# Patient Record
Sex: FEMALE | Race: WHITE
[De-identification: names, ages, dates, MRNs, and addresses within clinical notes are randomized per-mention and may not be internally consistent; named-entity substitution may affect disease eponyms.]

---

## 2020-11-24 ENCOUNTER — HOSPITAL ENCOUNTER (OUTPATIENT)
Dept: HOSPITAL 61 - KCIC | Age: 75
End: 2020-11-24
Attending: FAMILY MEDICINE
Payer: MEDICARE

## 2020-11-24 DIAGNOSIS — M16.11: Primary | ICD-10-CM

## 2020-11-24 PROCEDURE — 73502 X-RAY EXAM HIP UNI 2-3 VIEWS: CPT

## 2020-11-24 NOTE — KCIC
Right hip 2 views

 

INDICATION: Worsening hip pain. Recent pain injection in the office

 

COMPARISON: None

 

FINDINGS:

AP and frog-leg lateral views of the right hip show diffuse right hip 

joint space narrowing more conspicuous superiorly than medially with 

minimal subchondral sclerosis. In addition, there is osteophytic spurring 

around the humeral head, more confluent reactive bone formation around the

greater trochanter. No fracture or dislocation is apparent. Soft tissues 

are unremarkable.

 

IMPRESSION:

Advanced degenerative changes in the right hip joint. No fracture or 

dislocation shown.

 

Electronically signed by: Jnug Saunders MD (11/24/2020 4:08 PM) 

[id number]

## 2021-01-11 ENCOUNTER — HOSPITAL ENCOUNTER (OUTPATIENT)
Dept: HOSPITAL 61 - KCIC | Age: 76
Discharge: HOME | End: 2021-01-11
Attending: ORTHOPAEDIC SURGERY
Payer: MEDICARE

## 2021-01-11 DIAGNOSIS — M16.11: Primary | ICD-10-CM

## 2021-01-11 DIAGNOSIS — Z79.899: ICD-10-CM

## 2021-01-11 PROCEDURE — 20610 DRAIN/INJ JOINT/BURSA W/O US: CPT

## 2021-01-11 PROCEDURE — 77002 NEEDLE LOCALIZATION BY XRAY: CPT

## 2021-01-11 NOTE — KCIC
******** ADDENDUM #1 ********

Number of fluoroscopic images: 1



Electronically signed by: Esa Fields DO (3/12/2021 1:00 PM) [id number]

******** ORIGINAL REPORT ********

FLUOROSCOPICALLY GUIDED RIGHT HIP THERAPEUTIC INJECTION 



1.  INDICATION: The patient is a 75 years old Female who presented with right hip pain and osteoarthr
itis.



2.  CONSENT: The risks, benefits, treatment options, potential complications and personnel to be invo
lved were discussed (including the risks of radiation exposure, instruments to be used, contrast and 
anesthesia administration) with the patient. All questions were answered and consent was obtained. Th
e patient indicated willingness to proceed.  



3.  GENERAL:  

a)  Medication Reconciliation: The patient's medications and allergies were reviewed in the [facility] medical record and reconciled to the proposed procedure/treatment. 

     Pre-procedure Sign-in: Safety Checklist Performed    Yes

b)  Positioning: The patient was placed Supine on the fluoroscopy table.

c)  The hip was then sterilely prepped and draped.

d) Time Out: A time out was performed immediately prior to procedure start with the nursing, anesthes
ia and interventional team, correctly identifying the patient name, date of birth, procedure, anatomy
 (including marking of site and side), patient position, procedure consent form, relevant diagnostic 
and radiology test results, antibiotic administration, safety precautions, and procedure-specific equ
ipment needs. 

     Procedure Start Time / Timeout Time: 10:57

e)  Anesthesia Type: Local anesthesia: 3 mL 1% Lidocaine 



4.  PROCEDURE:

a)  Procedure Details: A 20g spinal needle was inserted into the hip joint. 1 mL Omnipaque 300 was in
jected to confirm intra-articular placement of needle. Contrast was observed to flow into the intra-a
rticular space of the joint without significant resistance. 6 mL of injectate was administered into t
he joint. The needle was removed. Images were stored to the permanent digital archive documenting nee
dle position.

b)  Injectate Contents:

      2 mL Methylprednisolone (Depo Medrol) 40mg/ml

      4 mL 0.25% Bupivacaine (Marcaine,Sensorcaine)

c)  Estimated Blood Loss: 0 mL



RADIATION DOSE:

Fluoroscopic Radiation Summary:



Fluoro time: 0:08 min:sec 



POST PROCEDURE: 

a)  Hemostasis: Hemostasis was achieved using light manual compression.

b)  Procedure End Time:  11:06

c)  Conclusion: The patient was discharged from the radiology department in stable condition.



COMPLICATIONS:

a)  Significant Patient Complication: None If other, explain:

b)  Complications during the procedure: None  If other, explain:



5.  RESULTS: Medication was injected into the joint. 



6. 

IMPRESSION: SUCCESSFUL FLUOROSCOPICALLY GUIDED THERAPEUTIC INJECTION OF THE RIGHT HIP AS DESCRIBED AB
AKASHE.



Electronically signed by: Esa Fields DO (1/11/2021 3:15 PM) [id number]

## 2021-02-03 ENCOUNTER — HOSPITAL ENCOUNTER (OUTPATIENT)
Dept: HOSPITAL 61 - ONCLAB | Age: 76
End: 2021-02-03
Attending: INTERNAL MEDICINE
Payer: MEDICARE

## 2021-02-03 DIAGNOSIS — D72.829: Primary | ICD-10-CM

## 2021-02-03 LAB
ANISOCYTOSIS BLD QL SMEAR: SLIGHT
BASOPHILS # BLD AUTO: 0.1 X10^3/UL (ref 0–0.2)
BASOPHILS NFR BLD: 1 % (ref 0–3)
EOSINOPHIL NFR BLD: 0.5 X10^3/UL (ref 0–0.7)
EOSINOPHIL NFR BLD: 5 % (ref 0–3)
ERYTHROCYTE [DISTWIDTH] IN BLOOD BY AUTOMATED COUNT: 12.9 % (ref 11.5–14.5)
HCT VFR BLD CALC: 42.7 % (ref 36–47)
HGB BLD-MCNC: 15.1 G/DL (ref 12–15.5)
LYMPHOCYTES # BLD: 2.2 X10^3/UL (ref 1–4.8)
LYMPHOCYTES NFR BLD AUTO: 22 % (ref 24–48)
MCH RBC QN AUTO: 31 PG (ref 25–35)
MCHC RBC AUTO-ENTMCNC: 35 G/DL (ref 31–37)
MCV RBC AUTO: 89 FL (ref 79–100)
MONO #: 0.7 X10^3/UL (ref 0–1.1)
MONOCYTES NFR BLD: 7 % (ref 0–9)
NEUT #: 6.4 X10^3/UL (ref 1.8–7.7)
NEUTROPHILS NFR BLD AUTO: 65 % (ref 31–73)
PLATELET # BLD AUTO: 227 X10^3/UL (ref 140–400)
PLATELET # BLD EST: ADEQUATE 10*3/UL
RBC # BLD AUTO: 4.81 X10^6/UL (ref 3.5–5.4)
WBC # BLD AUTO: 9.8 X10^3/UL (ref 4–11)

## 2021-02-03 PROCEDURE — 85025 COMPLETE CBC W/AUTO DIFF WBC: CPT

## 2021-02-03 PROCEDURE — 82746 ASSAY OF FOLIC ACID SERUM: CPT

## 2021-02-03 PROCEDURE — 36415 COLL VENOUS BLD VENIPUNCTURE: CPT

## 2021-02-03 PROCEDURE — 82607 VITAMIN B-12: CPT

## 2021-11-15 ENCOUNTER — HOSPITAL ENCOUNTER (OUTPATIENT)
Dept: HOSPITAL 61 - SURGPAT | Age: 76
End: 2021-11-15
Attending: ORTHOPAEDIC SURGERY
Payer: MEDICARE

## 2021-11-15 DIAGNOSIS — Z01.818: Primary | ICD-10-CM

## 2021-11-15 DIAGNOSIS — M16.11: ICD-10-CM

## 2021-11-15 LAB
ALBUMIN SERPL-MCNC: 4 G/DL (ref 3.4–5)
ANION GAP SERPL CALC-SCNC: 9 MMOL/L (ref 6–14)
APTT BLD: 28 SEC (ref 24–38)
BASOPHILS # BLD AUTO: 0.1 X10^3/UL (ref 0–0.2)
BASOPHILS NFR BLD: 1 % (ref 0–3)
BUN SERPL-MCNC: 12 MG/DL (ref 7–20)
CALCIUM SERPL-MCNC: 8.8 MG/DL (ref 8.5–10.1)
CHLORIDE SERPL-SCNC: 103 MMOL/L (ref 98–107)
CO2 SERPL-SCNC: 29 MMOL/L (ref 21–32)
CREAT SERPL-MCNC: 0.7 MG/DL (ref 0.6–1)
EOSINOPHIL NFR BLD: 0.5 X10^3/UL (ref 0–0.7)
EOSINOPHIL NFR BLD: 5 % (ref 0–3)
ERYTHROCYTE [DISTWIDTH] IN BLOOD BY AUTOMATED COUNT: 12.7 % (ref 11.5–14.5)
GFR SERPLBLD BASED ON 1.73 SQ M-ARVRAT: 80.9 ML/MIN
GLUCOSE SERPL-MCNC: 96 MG/DL (ref 70–99)
HCT VFR BLD CALC: 45.1 % (ref 36–47)
HGB BLD-MCNC: 15.4 G/DL (ref 12–15.5)
LYMPHOCYTES # BLD: 2.2 X10^3/UL (ref 1–4.8)
LYMPHOCYTES NFR BLD AUTO: 21 % (ref 24–48)
MCH RBC QN AUTO: 31 PG (ref 25–35)
MCHC RBC AUTO-ENTMCNC: 34 G/DL (ref 31–37)
MCV RBC AUTO: 90 FL (ref 79–100)
MONO #: 0.8 X10^3/UL (ref 0–1.1)
MONOCYTES NFR BLD: 7 % (ref 0–9)
NEUT #: 7 X10^3/UL (ref 1.8–7.7)
NEUTROPHILS NFR BLD AUTO: 66 % (ref 31–73)
PLATELET # BLD AUTO: 252 X10^3/UL (ref 140–400)
POTASSIUM SERPL-SCNC: 4.1 MMOL/L (ref 3.5–5.1)
PROTHROMBIN TIME: 12.5 SEC (ref 11.7–14)
RBC # BLD AUTO: 5.04 X10^6/UL (ref 3.5–5.4)
SODIUM SERPL-SCNC: 141 MMOL/L (ref 136–145)
WBC # BLD AUTO: 10.6 X10^3/UL (ref 4–11)

## 2021-11-15 PROCEDURE — 82306 VITAMIN D 25 HYDROXY: CPT

## 2021-11-15 PROCEDURE — 80048 BASIC METABOLIC PNL TOTAL CA: CPT

## 2021-11-15 PROCEDURE — 85651 RBC SED RATE NONAUTOMATED: CPT

## 2021-11-15 PROCEDURE — 82040 ASSAY OF SERUM ALBUMIN: CPT

## 2021-11-15 PROCEDURE — 93005 ELECTROCARDIOGRAM TRACING: CPT

## 2021-11-15 PROCEDURE — 87641 MR-STAPH DNA AMP PROBE: CPT

## 2021-11-15 PROCEDURE — 71046 X-RAY EXAM CHEST 2 VIEWS: CPT

## 2021-11-15 PROCEDURE — 85730 THROMBOPLASTIN TIME PARTIAL: CPT

## 2021-11-15 PROCEDURE — 85610 PROTHROMBIN TIME: CPT

## 2021-11-15 PROCEDURE — 83036 HEMOGLOBIN GLYCOSYLATED A1C: CPT

## 2021-11-15 PROCEDURE — 36415 COLL VENOUS BLD VENIPUNCTURE: CPT

## 2021-11-15 PROCEDURE — 85025 COMPLETE CBC W/AUTO DIFF WBC: CPT

## 2021-11-15 NOTE — EKG
[facility]

              [address] [place] [address]

Test Date:    2021-11-15               Test Time:    12:09:04

Pat Name:     LOR RON       Department:   

Patient ID:   [id number]           Room:          

Gender:       F                        Technician:   

:          [id number]               Requested By: EULALIO WINN

Order Number: [id number]           Reading MD:   Davy Bañuelos MD

                                 Measurements

Intervals                              Axis          

Rate:         92                       P:            56

CA:           132                      QRS:          7

QRSD:         82                       T:            76

QT:           352                                    

QTc:          440                                    

                           Interpretive Statements

SINUS RHYTHM

NON-SPECIFIC ST/T CHANGES

Electronically Signed On 2021 9:27:17 CST by Davy Bañuelos MD

## 2021-11-15 NOTE — RAD
EXAM: Chest, 2 views.



HISTORY: Preoperative evaluation. Hypertension.



COMPARISON: None.



FINDINGS: 2 views of the chest are obtained. There is no infiltrate, pleural effusion or pneumothorax
. The heart is normal in size. There is left basilar and lingular atelectasis or scarring.



IMPRESSION: No acute pulmonary finding.



Electronically signed by: Radha Frye MD (11/15/2021 1:18 PM) [id number]

## 2021-11-16 LAB — HBA1C MFR BLD: 5.8 % (ref 4.8–5.6)

## 2021-11-17 VITALS — SYSTOLIC BLOOD PRESSURE: 158 MMHG | DIASTOLIC BLOOD PRESSURE: 83 MMHG

## 2021-12-06 ENCOUNTER — HOSPITAL ENCOUNTER (OUTPATIENT)
Dept: HOSPITAL 61 - SURG | Age: 76
Setting detail: OBSERVATION
LOS: 2 days | Discharge: HOME HEALTH | End: 2021-12-08
Attending: ORTHOPAEDIC SURGERY | Admitting: ORTHOPAEDIC SURGERY
Payer: MEDICARE

## 2021-12-06 VITALS — DIASTOLIC BLOOD PRESSURE: 83 MMHG | SYSTOLIC BLOOD PRESSURE: 158 MMHG

## 2021-12-06 VITALS — DIASTOLIC BLOOD PRESSURE: 51 MMHG | SYSTOLIC BLOOD PRESSURE: 120 MMHG

## 2021-12-06 VITALS — WEIGHT: 148.15 LBS | HEIGHT: 61 IN | BODY MASS INDEX: 27.97 KG/M2

## 2021-12-06 VITALS — SYSTOLIC BLOOD PRESSURE: 111 MMHG | DIASTOLIC BLOOD PRESSURE: 37 MMHG

## 2021-12-06 DIAGNOSIS — Z96.649: ICD-10-CM

## 2021-12-06 DIAGNOSIS — M16.11: Primary | ICD-10-CM

## 2021-12-06 DIAGNOSIS — Z79.899: ICD-10-CM

## 2021-12-06 DIAGNOSIS — Z79.82: ICD-10-CM

## 2021-12-06 PROCEDURE — 97535 SELF CARE MNGMENT TRAINING: CPT

## 2021-12-06 PROCEDURE — 97162 PT EVAL MOD COMPLEX 30 MIN: CPT

## 2021-12-06 PROCEDURE — G0378 HOSPITAL OBSERVATION PER HR: HCPCS

## 2021-12-06 PROCEDURE — 85018 HEMOGLOBIN: CPT

## 2021-12-06 PROCEDURE — 88311 DECALCIFY TISSUE: CPT

## 2021-12-06 PROCEDURE — 86850 RBC ANTIBODY SCREEN: CPT

## 2021-12-06 PROCEDURE — 96376 TX/PRO/DX INJ SAME DRUG ADON: CPT

## 2021-12-06 PROCEDURE — A4930 STERILE, GLOVES PER PAIR: HCPCS

## 2021-12-06 PROCEDURE — 85014 HEMATOCRIT: CPT

## 2021-12-06 PROCEDURE — 27130 TOTAL HIP ARTHROPLASTY: CPT

## 2021-12-06 PROCEDURE — G0379 DIRECT REFER HOSPITAL OBSERV: HCPCS

## 2021-12-06 PROCEDURE — 97116 GAIT TRAINING THERAPY: CPT

## 2021-12-06 PROCEDURE — 86900 BLOOD TYPING SEROLOGIC ABO: CPT

## 2021-12-06 PROCEDURE — 97530 THERAPEUTIC ACTIVITIES: CPT

## 2021-12-06 PROCEDURE — 85610 PROTHROMBIN TIME: CPT

## 2021-12-06 PROCEDURE — 86901 BLOOD TYPING SEROLOGIC RH(D): CPT

## 2021-12-06 PROCEDURE — C1776 JOINT DEVICE (IMPLANTABLE): HCPCS

## 2021-12-06 PROCEDURE — 97165 OT EVAL LOW COMPLEX 30 MIN: CPT

## 2021-12-06 PROCEDURE — 96365 THER/PROPH/DIAG IV INF INIT: CPT

## 2021-12-06 PROCEDURE — 88304 TISSUE EXAM BY PATHOLOGIST: CPT

## 2021-12-06 PROCEDURE — A6550 NEG PRES WOUND THER DRSG SET: HCPCS

## 2021-12-06 PROCEDURE — A4213 20+ CC SYRINGE ONLY: HCPCS

## 2021-12-06 PROCEDURE — 72170 X-RAY EXAM OF PELVIS: CPT

## 2021-12-06 PROCEDURE — A6258 TRANSPARENT FILM >16<=48 IN: HCPCS

## 2021-12-06 PROCEDURE — 96366 THER/PROPH/DIAG IV INF ADDON: CPT

## 2021-12-06 PROCEDURE — 36415 COLL VENOUS BLD VENIPUNCTURE: CPT

## 2021-12-06 PROCEDURE — 97150 GROUP THERAPEUTIC PROCEDURES: CPT

## 2021-12-06 RX ADMIN — BACITRACIN SCH MLS/HR: 5000 INJECTION, POWDER, FOR SOLUTION INTRAMUSCULAR at 08:45

## 2021-12-06 RX ADMIN — HYDROMORPHONE HYDROCHLORIDE PRN MG: 2 INJECTION INTRAMUSCULAR; INTRAVENOUS; SUBCUTANEOUS at 09:29

## 2021-12-06 RX ADMIN — HYDROMORPHONE HYDROCHLORIDE PRN MG: 2 INJECTION INTRAMUSCULAR; INTRAVENOUS; SUBCUTANEOUS at 10:41

## 2021-12-06 RX ADMIN — HYDROMORPHONE HYDROCHLORIDE PRN MG: 2 INJECTION INTRAMUSCULAR; INTRAVENOUS; SUBCUTANEOUS at 10:19

## 2021-12-06 RX ADMIN — MULTIPLE VITAMINS W/ MINERALS TAB SCH TAB: TAB at 09:00

## 2021-12-06 RX ADMIN — ONDANSETRON SCH MG: 4 TABLET, ORALLY DISINTEGRATING ORAL at 19:01

## 2021-12-06 RX ADMIN — ONDANSETRON SCH MG: 2 INJECTION INTRAMUSCULAR; INTRAVENOUS at 12:00

## 2021-12-06 RX ADMIN — ONDANSETRON SCH MG: 4 TABLET, ORALLY DISINTEGRATING ORAL at 12:00

## 2021-12-06 RX ADMIN — SENNOSIDES AND DOCUSATE SODIUM SCH TAB: 8.6; 5 TABLET ORAL at 09:00

## 2021-12-06 RX ADMIN — HYDROMORPHONE HYDROCHLORIDE PRN MG: 2 INJECTION INTRAMUSCULAR; INTRAVENOUS; SUBCUTANEOUS at 10:01

## 2021-12-06 RX ADMIN — MORPHINE SULFATE PRN MG: 2 INJECTION, SOLUTION INTRAMUSCULAR; INTRAVENOUS at 09:17

## 2021-12-06 RX ADMIN — ONDANSETRON SCH MG: 4 TABLET, ORALLY DISINTEGRATING ORAL at 23:58

## 2021-12-06 RX ADMIN — ONDANSETRON SCH MG: 2 INJECTION INTRAMUSCULAR; INTRAVENOUS at 18:00

## 2021-12-06 RX ADMIN — Medication SCH MG: at 19:01

## 2021-12-06 RX ADMIN — HYDROMORPHONE HYDROCHLORIDE PRN MG: 2 INJECTION INTRAMUSCULAR; INTRAVENOUS; SUBCUTANEOUS at 09:42

## 2021-12-06 RX ADMIN — HYDROMORPHONE HYDROCHLORIDE PRN MG: 2 INJECTION INTRAMUSCULAR; INTRAVENOUS; SUBCUTANEOUS at 11:13

## 2021-12-06 RX ADMIN — MORPHINE SULFATE PRN MG: 2 INJECTION, SOLUTION INTRAMUSCULAR; INTRAVENOUS at 09:05

## 2021-12-06 RX ADMIN — HYDROMORPHONE HYDROCHLORIDE PRN MG: 2 INJECTION INTRAMUSCULAR; INTRAVENOUS; SUBCUTANEOUS at 11:56

## 2021-12-06 NOTE — OP
DATE OF SURGERY: 12/06/2021

PREOPERATIVE DIAGNOSIS:  Severe degenerative joint disease, right hip.



POSTOPERATIVE DIAGNOSIS:  Severe degenerative joint disease, right hip.



PROCEDURE:  Right total hip arthroplasty.



SURGEON:  Dimas Salomon Jr, DO.



FIRST ASSISTANT:  Gregor Enrique.



ANESTHESIA:  General.



COMPLICATIONS:  None.



ESTIMATED BLOOD LOSS:  250 mL



COMPONENTS:  Size 5 femur, size 52 acetabular shell with a 36 mm femoral head, 

elevated acetabular liner size E.



DESCRIPTION OF PROCEDURE:  The patient was taken to the operative suite, given a

general anesthetic, placed in the lateral decubitus position with the affected 

hip upright, this was prepped and draped in a sterile fashion.  A standard 

anterolateral approach was undertaken through the hip with incision through skin

and subcutaneous tissues that was taken to the iliotibial band, which was split 

in line with the skin incision.  This was retracted anteriorly and posteriorly 

and a portion of the gluteus medius and minimus were removed.  The upper 

one-half was remaining intact.  Neck was identified and opened up in an H 

fashion.  The head was then dislocated.  This was cut one fingerbreadth above 

the lesser trochanter and then this was measured and noted to be a size 46-47.  

This was subsequently reamed by 1 mm increments from size 46 all the way up to 

size 52 and then the acetabular shell was noted to be secured within the 

acetabulum and malleted in.  Two screws were placed through the acetabular shell

with very good fixation, one of 25 mm and one of 20 mm of length.  After this 

was noted to have good purchase, the elevated liner was then placed and impacted

and noted to be stable.  The femur was then opened with a  and then IM

guide was placed, this was removed and the broaching began at size 0 and 

continued up to size 5.  After this was noted to be size 5, this was noted to be

stable.  Therefore, this was reduced.  Attempted reductions at 0 and -2.5 were 

unsuccessful.  Therefore, at -5 this had a very good reduction.  Pistoning was 

good.  Full range of motion was noted without any signs of instability 

whatsoever.  Therefore, this was subsequently redislocated carefully and the 

actual femoral head was affixed on the femoral component and was relocated.  

This was again noted to be stable with extremes in range of motion.  This was 

then thoroughly irrigated again, as it was throughout the case and then was 

irrigated with iodine and then completely irrigated after that.  The capsule was

then reapproximated in an interrupted fashion.  The gluteus medius and minimus 

were reapproximated to their original position in an interrupted fashion.  Then 

as the iliotibial band was reapproximated in a running fashion, superficial 

tissue and skin was reapproximated.  A sterile dressing was applied.  The 

patient was then taken from the operative bed to the postoperative bed, taken to

the PACU in stable condition.







KODAK

DR: Saida   DD: 12/06/2021 08:54

DT: 12/06/2021 09:36   TID: [id number]

## 2021-12-06 NOTE — PDOC1
History and Physical


Date of Service:


DOS:


DATE: 12/6/21 


TIME: 14:00





Chief Complaint:


Chief Complain:


Status post right hip arthroplasty





History of Present Illness:


HPI:


Patient is a 73-year-old female who was brought in for surgery this morning with

orthopedics for a total right hip arthroplasty for Severe degenerative joint 

disease right hip. Was asked to admit patient after procedure from the PACU.


Patient was evaluated in the PACU said she was doing well resting in bed 

anesthesia still wearing off. Says her pain was currently well controlled. 

Denying any other sort of complaints.


Will admit to MedSurg unit. PT OT if okay with Ortho. Pain control


Patient was denying headache dizziness chest pain shortness of breath abdominal 

pain dysuria.





Past Medical/Surgical History:


PMH/PSH:


Severe degenerative joint disease right hip





Allergies:


Allergies:  


Coded Allergies:  


     No Known Drug Allergies (Unverified , 12/6/21)





Family History:


Family History:


Reviewed with patient none known





Social History:


Social History:


Denies alcohol tobacco drug





Current Medications:


Current Medications





Current Medications


Morphine Sulfate 5 mg/Ketorolac Tromethamine 30 mg/Ropivacaine 60 ml/Epinephrine

HCl 0.5 mg/ Miscellaneous 63 ml @ 63 mls/hr 1X PERIOP  ONCE INT ART  Last 

administered on 12/6/21at 08:23;  Start 12/6/21 at 06:00;  Stop 12/6/21 at 

06:59;  Status DC


Meloxicam (Mobic) 15 mg 1X PREOP  PRN PO PRIOR TO PROCEDURE Last administered on

12/6/21at 06:30;  Start 12/6/21 at 06:00;  Stop 12/6/21 at 18:00


Gabapentin (Neurontin) 300 mg 1X PREOP  PRN PO PRIOR TO PROCEDURE Last 

administered on 12/6/21at 06:30;  Start 12/6/21 at 06:00;  Stop 12/6/21 at 18:00


Acetaminophen (Tylenol) 1,000 mg 1X PREOP  PRN PO PRIOR TO PROCEDURE Last 

administered on 12/6/21at 06:30;  Start 12/6/21 at 06:00;  Stop 12/6/21 at 18:00


Cefazolin Sodium/ Dextrose 50 ml @  100 mls/hr 1X PREOP  PRN IV PRIOR TO 

PROCEDURE Last administered on 12/6/21at 07:27;  Start 12/6/21 at 06:00;  Stop 

12/6/21 at 18:00


Tranexamic Acid 50 ml @ 50 mls/hr 1X PERIOP  ONCE INJ  Last administered on 

12/6/21at 08:23;  Start 12/6/21 at 06:00;  Stop 12/6/21 at 06:59;  Status DC


Tranexamic Acid 50 ml @ 50 mls/hr 1X PERIOP  ONCE INJ ;  Start 12/6/21 at 08:00;

 Stop 12/6/21 at 08:59;  Status DC


Fentanyl Citrate (Fentanyl 2ml Vial) 25 mcg PRN Q5MIN  PRN IVP MILD PAIN 1-3;  

Start 12/6/21 at 06:15;  Stop 12/7/21 at 06:14


Fentanyl Citrate (Fentanyl 2ml Vial) 50 mcg PRN Q5MIN  PRN IVP MODERATE PAIN 4-

6;  Start 12/6/21 at 06:15;  Stop 12/7/21 at 06:14


Morphine Sulfate (Morphine Sulfate) 1 mg PRN Q10MIN  PRN IVP SEVERE PAIN 7-10 

Last administered on 12/6/21at 09:17;  Start 12/6/21 at 06:15;  Stop 12/7/21 at 

06:14


Ringer's Solution 1,000 ml @  30 mls/hr Q24H IV  Last administered on 12/6/21at 

06:29;  Start 12/6/21 at 06:15;  Stop 12/6/21 at 18:14


Hydromorphone HCl (Dilaudid) 0.5 mg PRN Q10MIN  PRN IVP SEVERE PAIN 7-10, 2nd 

CHOICE Last administered on 12/6/21at 11:56;  Start 12/6/21 at 06:15;  Stop 

12/7/21 at 06:14


Prochlorperazine Edisylate (Compazine) 5 mg PACU PRN  PRN IVP NAUSEA, MRX1;  

Start 12/6/21 at 06:15;  Stop 12/7/21 at 06:14


Propofol (Diprivan) 200 mg STK-MED ONCE IV ;  Start 12/6/21 at 06:48;  Stop 

12/6/21 at 06:48;  Status DC


Rocuronium Bromide (Zemuron) 50 mg STK-MED ONCE .ROUTE ;  Start 12/6/21 at 

06:49;  Stop 12/6/21 at 06:49;  Status DC


Propofol (Diprivan) 200 mg STK-MED ONCE IV ;  Start 12/6/21 at 06:49;  Stop 

12/6/21 at 06:49;  Status DC


Ondansetron HCl (Zofran) 4 mg STK-MED ONCE .ROUTE ;  Start 12/6/21 at 06:49;  

Stop 12/6/21 at 06:49;  Status DC


Fentanyl Citrate (Fentanyl 2ml Vial) 100 mcg STK-MED ONCE .ROUTE ;  Start 

12/6/21 at 06:50;  Stop 12/6/21 at 06:50;  Status DC


Phenylephrine HCl (PHENYLEPHRINE in 0.9% NACL PF) 1 mg STK-MED ONCE IV ;  Start 

12/6/21 at 06:59;  Stop 12/6/21 at 07:00;  Status DC


Phenylephrine HCl (PHENYLEPHRINE in 0.9% NACL PF) 1 mg STK-MED ONCE IV ;  Start 

12/6/21 at 07:00;  Stop 12/6/21 at 07:00;  Status DC


Glycopyrrolate (Robinul) 1 mg STK-MED ONCE .ROUTE ;  Start 12/6/21 at 07:00;  

Stop 12/6/21 at 07:01;  Status DC


Vancomycin HCl (Vancomycin) 1 gm STK-MED ONCE .ROUTE  Last administered on 

12/6/21at 08:22;  Start 12/6/21 at 07:13;  Stop 12/6/21 at 07:13;  Status DC


Tranexamic Acid 50 ml @ As Directed STK-MED ONCE .ROUTE ;  Start 12/6/21 at 

07:19;  Stop 12/6/21 at 07:19;  Status DC


Neostigmine Bromide (Neostigmine Methylsulfate) 5 mg STK-MED ONCE .ROUTE ;  

Start 12/6/21 at 08:27;  Stop 12/6/21 at 08:28;  Status DC


Fentanyl Citrate (Fentanyl 2ml Vial) 25 mcg PRN Q1HR  PRN IVP PAIN, 2nd CHOICE; 

Start 12/6/21 at 08:45


Diphenhydramine HCl (Benadryl) 25 mg PRN Q6HRS  PRN IVP ITCHING;  Start 12/6/21 

at 08:45


Multivitamins (Thera M Plus) 1 tab DAILY PO ;  Start 12/6/21 at 09:00


Senna/Docusate Sodium (Senna Plus) 1 tab DAILY PO ;  Start 12/6/21 at 09:00


Ferrous Sulfate (Feosol) 325 mg BIDWMEALS PO ;  Start 12/6/21 at 17:00


Sodium Chloride 1,000 ml @  40 mls/hr Q24H IV ;  Start 12/6/21 at 08:45


Prochlorperazine Maleate (Compazine) 10 mg PRN Q4HRS  PRN PO Nausea/vomiting, 

2nd choice;  Start 12/6/21 at 08:45


Metoclopramide HCl (Reglan Vial) 10 mg PRN Q4HRS  PRN IVP NAUSEA/VOMITING, 3rd 

CHOICE;  Start 12/6/21 at 08:45


Magnesium Hydroxide (Milk Of Magnesia) 2,400 mg 1X PRN  PRN PO CONSTIPATION;  

Start 12/7/21 at 06:00;  Stop 12/8/21 at 05:59


Bisacodyl (Dulcolax Supp) 10 mg 1X PRN  PRN AK CONSTIPATION;  Start 12/7/21 at 

16:00;  Stop 12/8/21 at 15:59


Zolpidem Tartrate (Ambien) 5 mg PRN QHS  PRN PO INSOMNIA, MAY REPEAT IN 1HR;  

Start 12/6/21 at 08:45


Calcium Carbonate/ Glycine (Tums) 500 mg PRN QID  PRN PO INDIGESTION;  Start 

12/6/21 at 08:45


Ketorolac Tromethamine 30 mg/Bupivacaine HCl 20 ml/ Epinephrine HCl 0.5 mg/ 

Miscellaneous 43 ml @  258 mls/hr Q12H INT ART ;  Start 12/6/21 at 18:00;  Stop 

12/7/21 at 06:09;  Status UNV


Sodium Chloride (Normal Saline Flush) 10 ml QSHIFT  PRN IV AFTER MEDS AND BLOOD 

DRAWS;  Start 12/6/21 at 08:45


Acetaminophen (Tylenol) 1,000 mg Q6H PO ;  Start 12/7/21 at 09:00


Tramadol HCl (Ultram) 50 mg Q6H PO ;  Start 12/7/21 at 06:00


Gabapentin (Neurontin) 100 mg Q8HRS PO ;  Start 12/7/21 at 06:00


Ondansetron HCl (Zofran) 4 mg Q6HRS IVP ;  Start 12/6/21 at 12:00;  Stop 12/7/21

at 06:01


Ondansetron HCl (Zofran Odt) 4 mg Q6HRS PO ;  Start 12/6/21 at 12:00;  Stop 

12/7/21 at 06:01


Ondansetron HCl (Zofran) 4 mg PRN Q6HRS  PRN IVP Nausea/vomiting, 1st choice;  

Start 12/7/21 at 12:00


Ondansetron HCl (Zofran Odt) 4 mg PRN Q6HRS  PRN PO Nausea/vomiting, 1st choice;

 Start 12/7/21 at 12:00


Oxycodone HCl (Roxicodone) 5 mg PRN Q4HRS  PRN PO Pain score 4-6;  Start 12/6/21

at 08:45


Dextrose (Dextrose 50%-Water Syringe) 12.5 gm PRN Q15MIN  PRN IV SEE COMMENTS;  

Start 12/6/21 at 08:45


Cefazolin Sodium/ Dextrose 50 ml @  100 mls/hr Q6H IV ;  Start 12/6/21 at 13:30;

 Stop 12/7/21 at 01:59


Aspirin (Michelle Aspirin) 325 mg DAILYWBKFT PO ;  Start 12/7/21 at 08:00


Morphine Sulfate (Morphine Sulfate) 2 mg STK-MED ONCE .ROUTE ;  Start 12/6/21 at

09:10;  Stop 12/6/21 at 09:10;  Status DC


Hydromorphone HCl (Dilaudid) 2 mg STK-MED ONCE .ROUTE ;  Start 12/6/21 at 09:24;

 Stop 12/6/21 at 09:24;  Status DC


Ondansetron HCl (Zofran) 4 mg PRN Q6HRS  PRN IVP NAUSEA/VOMITING;  Start 12/6/21

at 10:00


Calcium Carbonate/ Glycine (Tums) 500 mg PRN Q3HRS  PRN PO UPSET STOMACH;  Start

12/6/21 at 10:00;  Status UNV


Zolpidem Tartrate (Ambien) 5 mg PRN QHS  PRN PO INSOMNIA, MAY REPEAT IN 1HR;  

Start 12/6/21 at 10:00;  Status UNV


Info (Non-Icu Electrolyte Protocol) 1 ea PRN DAILY  PRN MC SEE COMMENTS;  Start 

12/6/21 at 10:00


Oxycodone HCl (Roxicodone) 5 mg PRN Q3HRS  PRN PO BREAKTHROUGH PAIN;  Start 

12/6/21 at 10:00;  Status UNV


Morphine Sulfate (Morphine Sulfate) 1 mg PRN Q1HR  PRN IV PAIN;  Start 12/6/21 

at 10:00;  Status UNV


Morphine Sulfate (Morphine Sulfate) 2 mg PRN Q1HR  PRN IV PAIN;  Start 12/6/21 

at 10:00;  Status UNV


Acetaminophen (Tylenol) 650 mg PRN Q6HRS  PRN PO Headaches, Temp > 101.5F;  

Start 12/6/21 at 10:00


Senna/Docusate Sodium (Senna Plus) 1 tab BID PO ;  Start 12/6/21 at 21:00;  

Status UNV


Heparin Sodium (Porcine) (Heparin Sodium) 5,000 unit Q12HR SQ ;  Start 12/7/21 

at 09:00;  Status UNV


Hydromorphone HCl (Dilaudid) 2 mg STK-MED ONCE .ROUTE ;  Start 12/6/21 at 10:25;

 Stop 12/6/21 at 10:26;  Status DC





Active Scripts


Active


Reported


Systane 0.3-0.4% Eye Drops (Propylene Glycol/Peg 400) 15 Ml Drops 1 Drop OP HS


[Essential Oil]   1 Cap PO DAILY


Vitamin D3 (Cholecalciferol (Vitamin D3)) 25 Mcg Capsule 25 Mcg PO DAILY


Tums (Calcium Carbonate) 300 Mg Tab.chew 300 Mg PO DAILY


Multivitamins (Multivitamin) 1 Each Tablet 1 Each PO DAILY


Meloxicam 15 Mg Tablet 15 Mg PO DAILY





ROS:


Review of Systems


Review of System


Unless noted in HPI 14 point review of systems was negative





Physical Exam:


Vital Signs:





Vital Signs








  Date Time  Temp Pulse Resp B/P (MAP) Pulse Ox O2 Delivery O2 Flow Rate FiO2


 


12/6/21 12:49  74 16 105/36 95 Nasal Cannula 2.0 


 


12/6/21 09:01 97.4       





 97.4       








Physcial Exam:


GEN:   No apparent distress.  Alert and oriented


HEENT:   Normal cephalic, atraumatic, external auditory canals are patent


EYES:   Extraocular muscles are intact, pupil are equally round and reactive to 

light and accommodation


MUSCULOSKELETAL: Range of motion limited by pain


ENDOCRINE:   No thyromegaly was palpated


LYMPHATICS:   No cervical chain or axillary nodes were noted


HEMATOPOIETIC:  No bruising


NECK:   Supple, no JVD, no thyromegaly was noted


LUNGS:   Clear to auscultation in all lung fields without rhonchi or wheezing


HEART:    RRR, S!, S2 present.  Peripheral pulses intact, no obvious murmurs 

noted


ABDOMEN:   Soft, nontender.  Positive bowel sounds, no organomegaly, normal 

bowel sounds


EXTREMITIES:   Without clubbing, cyanosis, or edema.  Pedal pulses intact.


NEUROLOGIC:   Normal speech and tone.  A&O x 3, moves all extremities, no 

obvious focal deficits


PSYCHIATRIC:   Normal affect, normal mood. Stable


SKIN:   No ulcerations or rashes, good skin turgor, no jaundice. Surgical site 

CDI


VASCULAR:   Good capillary refill, neurovascular bundle appears to be intact





Assessment/Plan


Assessment/Plan


Status post total right hip arthroplasty





-Admit to Medr. Patient tolerating postop course well


-As needed pain control


-Reviewed home meds looks like patient is mostly just on vitamins she can resume

these after discharge


-PT OT ordered


-Advance diet as tolerated.


-DVT prophylaxis


-Hip care per Ortho





Justifications for Admission


Other Justification














KAILA JONES MD          Dec 6, 2021 14:04

## 2021-12-06 NOTE — RAD
Pelvis radiograph: 12/6/2021 9:21 AM.



Reason for study: Postoperative.



Comparison: Pelvis radiograph 11/24/2020



Technique: Two views of the right hip are obtained.



Findings:

There are findings consistent with recent right total hip arthroplasty. Femoral and acetabular hardwa
re is in good alignment and position. Immediate postsurgical changes within the regional soft tissues
 are noted. No complications are evident. Left hip is intact.



Impression:



Status post right total hip arthroplasty. 



Electronically signed by: Alicia Brumfield MD (12/6/2021 9:37 AM) [id number]

## 2021-12-06 NOTE — PDOC4
OPERATIVE NOTE


Date:


Date:  Dec 6, 2021





Pre-Op Diagnosis:


Severe degenerative joint disease right hip





Post-Op Diagnosis:


Same





Procedure Performed:


Right total hip arthroplasty





Surgeon:


Umm





Anesthesia Type:


General





Blood Loss:


250 cc





Specimans Obtained:


Femoral head





Findings:


See dictation





Complications:


None





Operative Note:


Size 52 acetabular shell with a 36 mm femoral head -5 size 5 femoral











EULALIO WINN Jr., DO      Dec 6, 2021 08:42

## 2021-12-07 VITALS — DIASTOLIC BLOOD PRESSURE: 40 MMHG | SYSTOLIC BLOOD PRESSURE: 122 MMHG

## 2021-12-07 VITALS — SYSTOLIC BLOOD PRESSURE: 105 MMHG | DIASTOLIC BLOOD PRESSURE: 28 MMHG

## 2021-12-07 VITALS — SYSTOLIC BLOOD PRESSURE: 117 MMHG | DIASTOLIC BLOOD PRESSURE: 46 MMHG

## 2021-12-07 VITALS — SYSTOLIC BLOOD PRESSURE: 104 MMHG | DIASTOLIC BLOOD PRESSURE: 41 MMHG

## 2021-12-07 VITALS — SYSTOLIC BLOOD PRESSURE: 109 MMHG | DIASTOLIC BLOOD PRESSURE: 43 MMHG

## 2021-12-07 VITALS — DIASTOLIC BLOOD PRESSURE: 38 MMHG | SYSTOLIC BLOOD PRESSURE: 109 MMHG

## 2021-12-07 LAB
HCT VFR BLD CALC: 33.1 % (ref 36–47)
HGB BLD-MCNC: 11.2 G/DL (ref 12–15.5)
MCHC RBC AUTO-ENTMCNC: 34 G/DL (ref 31–37)
PROTHROMBIN TIME: 13.6 SEC (ref 11.7–14)

## 2021-12-07 RX ADMIN — ACETAMINOPHEN SCH MG: 500 TABLET ORAL at 15:00

## 2021-12-07 RX ADMIN — Medication SCH MG: at 17:55

## 2021-12-07 RX ADMIN — SENNOSIDES AND DOCUSATE SODIUM SCH TAB: 8.6; 5 TABLET ORAL at 08:22

## 2021-12-07 RX ADMIN — ACETAMINOPHEN SCH MG: 500 TABLET ORAL at 20:23

## 2021-12-07 RX ADMIN — BACITRACIN SCH MLS/HR: 5000 INJECTION, POWDER, FOR SOLUTION INTRAMUSCULAR at 08:45

## 2021-12-07 RX ADMIN — MULTIPLE VITAMINS W/ MINERALS TAB SCH TAB: TAB at 08:22

## 2021-12-07 RX ADMIN — GABAPENTIN SCH MG: 100 CAPSULE ORAL at 13:29

## 2021-12-07 RX ADMIN — ACETAMINOPHEN SCH MG: 500 TABLET ORAL at 08:22

## 2021-12-07 RX ADMIN — ASPIRIN 325 MG ORAL TABLET SCH MG: 325 PILL ORAL at 08:21

## 2021-12-07 RX ADMIN — ONDANSETRON SCH MG: 2 INJECTION INTRAMUSCULAR; INTRAVENOUS at 00:00

## 2021-12-07 RX ADMIN — ONDANSETRON SCH MG: 4 TABLET, ORALLY DISINTEGRATING ORAL at 06:00

## 2021-12-07 RX ADMIN — GABAPENTIN SCH MG: 100 CAPSULE ORAL at 06:00

## 2021-12-07 RX ADMIN — ONDANSETRON SCH MG: 2 INJECTION INTRAMUSCULAR; INTRAVENOUS at 06:00

## 2021-12-07 RX ADMIN — GABAPENTIN SCH MG: 100 CAPSULE ORAL at 22:48

## 2021-12-07 RX ADMIN — Medication SCH MG: at 08:22

## 2021-12-07 NOTE — PDOC
[employer] PROGRESS NOTE


Date of Service


DOS:


DATE: 12/7/21 


TIME: 11:25





Chief Complaint


Chief Complaint


A/P:


Right hip OA - Status post total right hip arthroplasty on 12/6/2021





-Admit to [facility]. Patient tolerating postop course well


-As needed pain control


-Reviewed home meds looks like patient is mostly just on vitamins she can resume

these after discharge


-PT OT ordered


-Advance diet as tolerated.


-DVT prophylaxis - warfarin, daily INR


-Hip care per Ortho





History of Present Illness


History of Present Illness


Patient is a 73-year-old female who was brought in for surgery 12/6/2021 in the 

morning with orthopedics for a total right hip arthroplasty for Severe 

degenerative joint disease right hip. Was asked to admit patient after procedure

from the PACU.


Patient was evaluated in the PACU said she was doing well resting in bed 

anesthesia still wearing off. Says her pain was currently well controlled. 

Denying any other sort of complaints.


Admitted to MedSur unit. PT OT if okay with Ortho. Pain control





12/7: Patient was denying headache dizziness chest pain shortness of breath 

abdominal pain dysuria. Working in PT class today.





Vitals/I&O


Vitals/I&O:





                                   Vital Signs








  Date Time  Temp Pulse Resp B/P (MAP) Pulse Ox O2 Delivery O2 Flow Rate FiO2


 


12/7/21 08:15      Room Air  


 


12/7/21 07:00 98.0 88 16 122/40 (67) 95   





 98.0       


 


12/7/21 00:26       2.0 














                                    I & O   


 


 12/6/21 12/6/21 12/7/21





 15:00 23:00 07:00


 


Intake Total 1050 ml  480 ml


 


Output Total 100 ml  


 


Balance 950 ml  480 ml











Physical Exam


General:  Alert, Oriented X3, Cooperative


Heart:  Regular rate, Normal S1, Normal S2


Lungs:  Clear


Abdomen:  Normal bowel sounds, Soft


Extremities:  No clubbing, No cyanosis


Skin:  No rashes, No breakdown, Other





Labs


Labs:





Laboratory Tests








Test


 12/7/21


04:30


 


Hemoglobin


 11.2 g/dL


(12.0-15.5)


 


Hematocrit


 33.1 %


(36.0-47.0)


 


Mean Corpuscular Hemoglobin


Concent 34 g/dL


(31-37)


 


Prothrombin Time


 13.6 SEC


(11.7-14.0)


 


Prothromb Time International


Ratio 1.0 (0.8-1.1) 














Comment


Review of Relevant


I have reviewed the following items stephanie (where applicable) has been applied.


Medications:





Current Medications








 Medications


  (Trade)  Dose


 Ordered  Sig/Nanette


 Route


 PRN Reason  Start Time


 Stop Time Status Last Admin


Dose Admin


 


 Ferrous Sulfate


  (Feosol)  325 mg  BIDWMEALS


 PO


   12/6/21 17:00


    12/7/21 08:22





 


 Acetaminophen


  (Tylenol)  1,000 mg  Q6H


 PO


   12/7/21 09:00


    12/7/21 08:22





 


 Ondansetron HCl


  (Zofran Odt)  4 mg  Q6HRS


 PO


   12/6/21 12:00


 12/7/21 06:01 DC 12/6/21 23:58





 


 Cefazolin Sodium/


 Dextrose  50 ml @ 


 100 mls/hr  Q6H


 IV


   12/6/21 13:30


 12/7/21 01:59 DC 12/7/21 08:11





 


 Aspirin


  (Michelle Aspirin)  325 mg  DAILYWBKFT


 PO


   12/7/21 08:00


    12/7/21 08:21














Justifications for Admission


Other Justification














KAILA GARCES MD         Dec 7, 2021 11:26

## 2021-12-08 VITALS — DIASTOLIC BLOOD PRESSURE: 41 MMHG | SYSTOLIC BLOOD PRESSURE: 119 MMHG

## 2021-12-08 VITALS — DIASTOLIC BLOOD PRESSURE: 55 MMHG | SYSTOLIC BLOOD PRESSURE: 130 MMHG

## 2021-12-08 VITALS — DIASTOLIC BLOOD PRESSURE: 47 MMHG | SYSTOLIC BLOOD PRESSURE: 123 MMHG

## 2021-12-08 LAB
HCT VFR BLD CALC: 31.3 % (ref 36–47)
HGB BLD-MCNC: 10.4 G/DL (ref 12–15.5)
MCHC RBC AUTO-ENTMCNC: 33 G/DL (ref 31–37)
PROTHROMBIN TIME: 15.8 SEC (ref 11.7–14)

## 2021-12-08 RX ADMIN — ASPIRIN 325 MG ORAL TABLET SCH MG: 325 PILL ORAL at 08:56

## 2021-12-08 RX ADMIN — GABAPENTIN SCH MG: 100 CAPSULE ORAL at 06:06

## 2021-12-08 RX ADMIN — MULTIPLE VITAMINS W/ MINERALS TAB SCH TAB: TAB at 08:54

## 2021-12-08 RX ADMIN — Medication SCH MG: at 08:54

## 2021-12-08 RX ADMIN — SENNOSIDES AND DOCUSATE SODIUM SCH TAB: 8.6; 5 TABLET ORAL at 08:54

## 2021-12-08 RX ADMIN — ACETAMINOPHEN SCH MG: 500 TABLET ORAL at 03:05

## 2021-12-08 RX ADMIN — ACETAMINOPHEN SCH MG: 500 TABLET ORAL at 08:55

## 2021-12-08 NOTE — NUR
Discharge Note:



LOR RON [address] [place] [address]



Discharge instructions and discharge home medications reviewed with Patient and a copy 
given. All questions have been answered and understanding verbalized. 



The following instructions and handouts were given: dIET, ACTIVITY MEDICATION LIST AND 
FOLLOW UP INSTRUCTIONS PROVIDED. POST OP INSTRUCTIONS PROVIDED. KATH drain CDI to right hip.



Discontinued lines and drains: Peripheral IV discontinued and catheter intact.



Patient discharged to Home w/services with Spouse via Wheelchair

## 2021-12-08 NOTE — PDOC3
Discharge Summary


Visit Information


Date of Admission:  Dec 6, 2021


Date of Discharge:  Dec 8, 2021


Admitting Diagnosis:  Right hip osteoarthritis


Final Diagnosis


Right hip OA





Brief Hospital Course


Allergies





                                    Allergies








Coded Allergies Type Severity Reaction Last Updated Verified


 


  No Known Drug Allergies    12/6/21 No








Vital Signs





Vital Signs








  Date Time  Temp Pulse Resp B/P (MAP) Pulse Ox O2 Delivery O2 Flow Rate FiO2


 


12/8/21 08:00      Room Air  


 


12/8/21 07:47     92  2.0 


 


12/8/21 07:00 98.7 92 16 119/41 (67)    





 98.7       








Lab Results





Laboratory Tests








Test


 12/7/21


04:30 12/8/21


06:15


 


Hemoglobin


 11.2 g/dL


(12.0-15.5) 10.4 g/dL


(12.0-15.5)


 


Hematocrit


 33.1 %


(36.0-47.0) 31.3 %


(36.0-47.0)


 


Mean Corpuscular Hemoglobin


Concent 34 g/dL


(31-37) 33 g/dL


(31-37)


 


Prothrombin Time


 13.6 SEC


(11.7-14.0) 15.8 SEC


(11.7-14.0)


 


Prothromb Time International


Ratio 1.0 (0.8-1.1) 


 1.3 (0.8-1.1) 











Laboratory Tests








Test


 12/8/21


06:15


 


Hemoglobin


 10.4 g/dL


(12.0-15.5)


 


Hematocrit


 31.3 %


(36.0-47.0)


 


Mean Corpuscular Hemoglobin


Concent 33 g/dL


(31-37)


 


Prothrombin Time


 15.8 SEC


(11.7-14.0)


 


Prothromb Time International


Ratio 1.3 (0.8-1.1) 











Brief Hospital Course


Patient is a 73-year-old female who was brought in for surgery 12/6/2021 in the 

morning with orthopedics for a total right hip arthroplasty for Severe 

degenerative joint disease right hip. Was asked to admit patient after procedure

from the PACU.


Patient was evaluated in the PACU said she was doing well resting in bed 

anesthesia still wearing off. Says her pain was currently well controlled. 

Denying any other sort of complaints.


Admitted to MedSurg unit. PT OT if okay with Ortho. Pain control





12/7: Patient was denying headache dizziness chest pain shortness of breath 

abdominal pain dysuria. Working in PT class today. 





12/8: Seen and examined.  Pain is well controlled on any 1 tramadol in the last 

24 hours.  Discussed orthopedic surgery will be on aspirin 325 mg for the next 

21 days and then transition to daily and can reinitiate Mobic at that time.  We 

will follow up with orthopedic surgery outpatient and will go home with home 

health.





Problem list:


Right hip OA - Status post total right hip arthroplasty on 12/6/2021








Greater than 30 minutes spent on d/c home with home health





Discharge Information


Condition at Discharge:  Improved


Follow Up:  Weeks (2)


Disposition/Orders:  D/C to Home w/ HH


Scheduled


Aspirin (Aspirin) 325 Mg Tablet, 325 MG PO BIDAC for DVT PPX for 21 Days, #42


   Prescribed by: KAILA GARCES MD on 12/8/21 1212


Calcium Carbonate (Tums) 300 Mg Tab.chew, 300 MG PO DAILY for CALCIUM 

SUPPLEMENT, (Reported)


   Entered as Reported by: TOMMY CURTIS on 11/17/21 1710


   Last Taken: Unknown Dose on 12/5/21      Last Action: Last Taken Edited on 

12/6/21 0626 by DOTTY GUTIERREZ


Cholecalciferol (Vitamin D3) (Vitamin D3) 25 Mcg Capsule, 25 MCG PO DAILY for 

SUPPLEMENT, (Reported)


   Entered as Reported by: TOMMY CURTIS on 11/17/21 1710


   Last Taken: Unknown Dose on 11/28/21      Last Action: Last Taken Edited on 

12/6/21 0626 by DOTTY GUTIERREZ


Meloxicam (Meloxicam) 15 Mg Tablet, 15 MG PO DAILY for PAIN CONTROL, (Reported)


   Entered as Reported by: TOMMY CURTIS on 11/17/21 1710


   Last Taken: Unknown Dose on 12/5/21      Last Action: Last Taken Edited on 

12/6/21 0626 by DOTTY GUTIERREZ


Multivitamin (Multivitamins) 1 Each Tablet, 1 EACH PO DAILY for SUPPLEMENT, 

(Reported)


   Entered as Reported by: TOMMY CURTIS on 11/17/21 1710


   Last Taken: Unknown Dose on 11/28/21      Last Action: Last Taken Edited on 

12/6/21 0626 by DOTTY GUTIERREZ


Propylene Glycol/Peg 400 (Systane 0.3-0.4% Eye Drops) 15 Ml Drops, 1 DROP OP HS 

for DRY EYE, (Reported)


   Entered as Reported by: TOMMY CURTIS on 11/17/21 1710


   Last Taken: Unknown Dose on 12/5/21      Last Action: Last Taken Edited on 

12/6/21 0626 by DOTTY GUTIERREZ


[Essential Oil]  , 1 CAP PO DAILY for SUPPLEMENT, (Reported)


   Entered as Reported by: TOMMY CURTIS on 11/17/21 1710


   Last Taken: Unknown Dose on 11/28/21      Last Action: Last Taken Edited on 

12/6/21 0626 by DOTTY GUTIERREZ





Justicifation of Admission Dx:


Justifications for Admission:


Justification of Admission Dx:  Yes











KAILA GARCES MD         Dec 8, 2021 12:25

## 2021-12-08 NOTE — SNU/HH DC
DISCHARGE WITH HOME HEALTH


DISCHARGE INFORMATION:


Discharge Date:  Dec 8, 2021


Final Diagnosis:


Right hip OA





HOME HEALTH:


Face to Face:


I certify this patient is under my care and that I, or a nurse practitioner or 

physician's assistant working with me, had a face to face encounter that meets 

the physician face to face encounter requirements with this patient on 

12/8/2021.


Medical Complications:  S/P Joint Replacement (Right hip)


Skilled Nursing For:  Assess/Skilled Observatio, Pain Management


RN For Eval/Treatment:  Yes


Physical Therapy For:  Evalulation/Treatment


Occupational Therapy For:  Evaluation/Treatment


Pt Meets Homebound Status:  Fatigue w/ amb.





POST DISCHARGE ORDERS:


Activity Instructions for Disc:  Resume previous activity


Weight Bearing Status after Di:  Full weight bearing


DIET AFTER DISCHARGE:  Regular


Wound/Incision Care:  Ice to area for comfort





CHECKS AFTER DISCHARGE:


Checks after discharge:  Check blood press - daily, Check your Temp as needed





FOLLOW-UP:


Additional Instructions:


[facility]


[address] [place] [id number]


Phone: [phone]





TREATMENT/EQUIPMENT ORDERS:


Adaptive Equipment Issued:  Front wheeled walker





CERTIFICATION STATEMENT:


Certification Statement:


Certification Statement: Based on the above finding, I certify that this patient

is confined to the home and needs intermittent skilled nursing care, physical 

therapy and/or speech therapy, or continues to need occupational therapy.~ This 

patient is under my care, and I have initiated the establishment of the plan of 

care.~ This patient will be followed by myself or a community physician who will

periodically review the plan of care.


Home Meds


Active Scripts


Tramadol Hcl (TRAMADOL HCL) 50 Mg Tablet, 50 MG PO PRN Q6HRS PRN for PAIN for 6 

Days, #15 TAB


   Prov:KAILA GARCES MD         12/8/21


Aspirin (ASPIRIN) 325 Mg Tablet, 325 MG PO BIDAC for DVT PPX for 21 Days, #42 

TAB


   Prov:KAILA GARCES MD         12/8/21


Reported Medications


Propylene Glycol/Peg 400 (SYSTANE 0.3-0.4% EYE DROPS) 15 Ml Drops, 1 DROP OP HS 

for DRY EYE, DROP


   11/17/21


[Essential Oil]   No Conflict Check, 1 CAP PO DAILY for SUPPLEMENT


   11/17/21


Cholecalciferol (Vitamin D3) (Vitamin D3) 25 Mcg Capsule, 25 MCG PO DAILY for 

SUPPLEMENT, CAP


   11/17/21


Calcium Carbonate (TUMS) 300 Mg Tab.chew, 300 MG PO DAILY for CALCIUM 

SUPPLEMENT, TAB.CHEW


   11/17/21


Multivitamin (MULTIVITAMINS) 1 Each Tablet, 1 EACH PO DAILY for SUPPLEMENT, TAB


   11/17/21


Meloxicam (MELOXICAM) 15 Mg Tablet, 15 MG PO DAILY for PAIN CONTROL, TAB


   11/17/21











KAILA GARCES MD         Dec 8, 2021 12:31

## 2021-12-08 NOTE — PN
DATE: 12/08/2021

The patient was seen postoperative day #1 as well as postoperative day #2, 

following her right total hip arthroplasty.  Incision looks very good at this 

point. There is no significant drainage at this point. She is progressing well 

with physical therapy with no signs or symptoms of infection.  No signs or 

symptoms of DVT.  She is able to go from a seated to a standing position without

any difficulty and ambulating quite well at this point.  Medically and 

orthopedically, she is ready for transfer.  She would like to do outpatient 

physical therapy at this point with home health and eventually true outpatient 

therapy as she progresses.  She is full weightbearing.  All questions were 

answered to her and her family's satisfaction at this point.







SUKH

DR: Saida   DD: 12/08/2021 16:00

DT: 12/08/2021 22:15   TID: [id number]

## 2021-12-08 NOTE — PATHOLOGY
[facility] Accession Number: [id number]

.                                                                01

Material submitted:                                        .

femur - RIGHT FEMORAL HEAD AND BONE. Modifiers: right, head

.                                                                01

Clinical history:                                          .

RIGHT HIP PAIN

RIGHT TOTAL HIP

.                                                                02

**********************************************************************

Diagnosis:

Bone "right femoral head" (total arthroplasty):

  - Marked and focally complete erosion of articular cartilage, consistent

     with degenerative arthritis.

  - Negative for malignancy.

(MLK:pit; 12/08/2021)

QTP  12/08/2021  1629 Local

**********************************************************************

.                                                                02

Electronically signed:                                     .

Live Cruz MD, Pathologist

NPI- [id number]

.                                                                01

Gross description:                                         .

The specimen is received in formalin, labeled "Yanna Lara, right

femoral head and bone".  Received is a femoral head with attached femoral

neck measuring 4.7 x 4.7 x 4.5 cm in greatest dimensions.  The articular

surface is smooth to granular in appearance with a small amount of

eburnation identified.  Moderate to severe osteophytic lipping is

identified.  Sectioning reveals yellow-tan cut surfaces with no grossly

distinct nodules or lesions.  The specimen is submitted representatively

in cassette A1, following decalcification.

(CAA; 12/6/2021)

QAC/QAC  12/06/2021  1704 Local

.                                                                02

Pathologist provided ICD-10:

M24.151

.                                                                02

CPT                                                        .

235592, [id number]

Specimen Comment: A courtesy copy of this report has been sent to [phone], [phone]

Specimen Comment: [id number]

Specimen Comment: Report sent to  / DR HAGEN

Specimen Comment: A duplicate report has been generated due to demographic updates.

***Performed at:  01

   [address], [place], [place]  [id number]

   MD Ravi Sims MD Phone:  [phone]

***Performed at:  02

   [address]  [id number]

   MD Flavio Smith MD Phone:  [phone]

## 2021-12-08 NOTE — PDOC
[facility] PROGRESS NOTE


Date of Service


DOS:


DATE: 12/8/21 


TIME: 12:09





Chief Complaint


Chief Complaint


A/P:


Right hip OA - Status post total right hip arthroplasty on 12/6/2021





-Admit to [facility]. Patient tolerating postop course well


-As needed pain control


-Reviewed home meds looks like patient is mostly just on vitamins she can resume

these after discharge


-PT OT ordered


-Advance diet as tolerated.


-DVT prophylaxis - warfarin, daily INR


-Hip care per Ortho





History of Present Illness


History of Present Illness


Patient is a 73-year-old female who was brought in for surgery 12/6/2021 in the 

morning with orthopedics for a total right hip arthroplasty for Severe 

degenerative joint disease right hip. Was asked to admit patient after procedure

from the PACU.


Patient was evaluated in the PACU said she was doing well resting in bed 

anesthesia still wearing off. Says her pain was currently well controlled. 

Denying any other sort of complaints.


Admitted to MedSur unit. PT OT if okay with Ortho. Pain control





12/7: Patient was denying headache dizziness chest pain shortness of breath 

abdominal pain dysuria. Working in PT class today. 





12/8: Seen and examined.  Pain is well controlled on any 1 tramadol in the last 

24 hours.  Discussed orthopedic surgery will be on aspirin 325 mg for the next 

21 days and then transition to daily and can reinitiate Mobic at that time.  We 

will follow up with orthopedic surgery outpatient and will go home with home 

health.





Vitals/I&O


Vitals/I&O:





                                   Vital Signs








  Date Time  Temp Pulse Resp B/P (MAP) Pulse Ox O2 Delivery O2 Flow Rate FiO2


 


12/8/21 08:00      Room Air  


 


12/8/21 07:47     92  2.0 


 


12/8/21 07:00 98.7 92 16 119/41 (67)    





 98.7       














                                    I & O   


 


 12/7/21 12/7/21 12/8/21





 15:00 23:00 07:00


 


Intake Total 290 ml 100 ml 0 ml


 


Balance 290 ml 100 ml 0 ml











Physical Exam


General:  Alert, Oriented X3, Cooperative


Heart:  Regular rate, Normal S1, Normal S2


Lungs:  Clear


Abdomen:  Normal bowel sounds, Soft


Extremities:  No clubbing, No cyanosis


Skin:  No rashes, No breakdown, Other





Labs


Labs:





Laboratory Tests








Test


 12/8/21


06:15


 


Prothrombin Time


 15.8 SEC


(11.7-14.0)


 


Prothromb Time International


Ratio 1.3 (0.8-1.1) 














Comment


Review of Relevant


I have reviewed the following items stephanie (where applicable) has been applied.


Medications:





Current Medications








 Medications


  (Trade)  Dose


 Ordered  Sig/Nanette


 Route


 PRN Reason  Start Time


 Stop Time Status Last Admin


Dose Admin


 


 Warfarin Sodium


  (Coumadin)  6 mg  1X WARF  ONCE


 PO


   12/7/21 16:00


 12/7/21 16:01 DC 12/7/21 17:00














Justifications for Admission


Other Justification














KAILA GARCES MD         Dec 8, 2021 12:23